# Patient Record
Sex: FEMALE | Race: WHITE | NOT HISPANIC OR LATINO | Employment: FULL TIME | URBAN - METROPOLITAN AREA
[De-identification: names, ages, dates, MRNs, and addresses within clinical notes are randomized per-mention and may not be internally consistent; named-entity substitution may affect disease eponyms.]

---

## 2017-09-19 ENCOUNTER — HOSPITAL ENCOUNTER (OUTPATIENT)
Dept: LAB | Facility: MEDICAL CENTER | Age: 37
End: 2017-09-19
Payer: COMMERCIAL

## 2017-09-19 LAB
BDY FAT % MEASURED: 36.2 %
BP DIAS: 81 MMHG
BP SYS: 130 MMHG
CHOLEST SERPL-MCNC: 134 MG/DL (ref 100–199)
DIABETES HTDIA: NO
EVENT NAME HTEVT: NORMAL
FASTING HTFAS: YES
GLUCOSE SERPL-MCNC: 96 MG/DL (ref 65–99)
HDLC SERPL-MCNC: 47 MG/DL
HYPERTENSION HTHYP: NO
LDLC SERPL CALC-MCNC: 71 MG/DL
SCREENING LOC CITY HTCIT: NORMAL
SCREENING LOC STATE HTSTA: NORMAL
SCREENING LOCATION HTLOC: NORMAL
SMOKING HTSMO: NO
SUBSCRIBER ID HTSID: NORMAL
TRIGL SERPL-MCNC: 78 MG/DL (ref 0–149)

## 2017-09-19 PROCEDURE — 80061 LIPID PANEL: CPT

## 2017-09-19 PROCEDURE — 36415 COLL VENOUS BLD VENIPUNCTURE: CPT

## 2017-09-19 PROCEDURE — 82947 ASSAY GLUCOSE BLOOD QUANT: CPT

## 2017-09-19 PROCEDURE — S5190 WELLNESS ASSESSMENT BY NONPH: HCPCS

## 2018-09-26 ENCOUNTER — HOSPITAL ENCOUNTER (OUTPATIENT)
Dept: LAB | Facility: MEDICAL CENTER | Age: 38
End: 2018-09-26
Payer: COMMERCIAL

## 2018-09-26 LAB
BDY FAT % MEASURED: 33.9 %
BP DIAS: 84 MMHG
BP SYS: 123 MMHG
CHOLEST SERPL-MCNC: 151 MG/DL (ref 100–199)
DIABETES HTDIA: NO
EVENT NAME HTEVT: NORMAL
FASTING HTFAS: YES
GLUCOSE SERPL-MCNC: 93 MG/DL (ref 65–99)
HDLC SERPL-MCNC: 42 MG/DL
HYPERTENSION HTHYP: NO
LDLC SERPL CALC-MCNC: 92 MG/DL
SCREENING LOC CITY HTCIT: NORMAL
SCREENING LOC STATE HTSTA: NORMAL
SCREENING LOCATION HTLOC: NORMAL
SMOKING HTSMO: NO
SUBSCRIBER ID HTSID: NORMAL
TRIGL SERPL-MCNC: 86 MG/DL (ref 0–149)

## 2018-09-26 PROCEDURE — 80061 LIPID PANEL: CPT

## 2018-09-26 PROCEDURE — 82947 ASSAY GLUCOSE BLOOD QUANT: CPT

## 2018-09-26 PROCEDURE — S5190 WELLNESS ASSESSMENT BY NONPH: HCPCS

## 2018-09-26 PROCEDURE — 36415 COLL VENOUS BLD VENIPUNCTURE: CPT

## 2018-10-03 ENCOUNTER — IMMUNIZATION (OUTPATIENT)
Dept: OCCUPATIONAL MEDICINE | Facility: CLINIC | Age: 38
End: 2018-10-03

## 2018-10-03 DIAGNOSIS — Z23 NEED FOR VACCINATION: ICD-10-CM

## 2018-10-10 PROCEDURE — 90686 IIV4 VACC NO PRSV 0.5 ML IM: CPT | Performed by: PREVENTIVE MEDICINE

## 2019-09-27 ENCOUNTER — HOSPITAL ENCOUNTER (OUTPATIENT)
Dept: LAB | Facility: MEDICAL CENTER | Age: 39
End: 2019-09-27
Payer: COMMERCIAL

## 2019-09-27 LAB
BDY FAT % MEASURED: 41.8 %
BP DIAS: 87 MMHG
BP SYS: 129 MMHG
CHOLEST SERPL-MCNC: 155 MG/DL (ref 100–199)
DIABETES HTDIA: NO
EVENT NAME HTEVT: NORMAL
FASTING HTFAS: YES
GLUCOSE SERPL-MCNC: 98 MG/DL (ref 65–99)
HDLC SERPL-MCNC: 43 MG/DL
HYPERTENSION HTHYP: NO
LDLC SERPL CALC-MCNC: 98 MG/DL
SCREENING LOC CITY HTCIT: NORMAL
SCREENING LOC STATE HTSTA: NORMAL
SCREENING LOCATION HTLOC: NORMAL
SMOKING HTSMO: NO
SUBSCRIBER ID HTSID: NORMAL
TRIGL SERPL-MCNC: 71 MG/DL (ref 0–149)

## 2019-09-27 PROCEDURE — 36415 COLL VENOUS BLD VENIPUNCTURE: CPT

## 2019-09-27 PROCEDURE — S5190 WELLNESS ASSESSMENT BY NONPH: HCPCS

## 2019-09-27 PROCEDURE — 82947 ASSAY GLUCOSE BLOOD QUANT: CPT

## 2019-09-27 PROCEDURE — 80061 LIPID PANEL: CPT

## 2020-05-07 ENCOUNTER — PHYSICAL THERAPY (OUTPATIENT)
Dept: PHYSICAL THERAPY | Facility: REHABILITATION | Age: 40
End: 2020-05-07
Attending: FAMILY MEDICINE
Payer: COMMERCIAL

## 2020-05-07 DIAGNOSIS — M25.539 PAIN IN WRIST, UNSPECIFIED LATERALITY: ICD-10-CM

## 2020-05-07 DIAGNOSIS — M25.519 SHOULDER PAIN, UNSPECIFIED CHRONICITY, UNSPECIFIED LATERALITY: ICD-10-CM

## 2020-05-07 PROCEDURE — 97535 SELF CARE MNGMENT TRAINING: CPT

## 2020-05-07 PROCEDURE — 97162 PT EVAL MOD COMPLEX 30 MIN: CPT

## 2020-05-07 ASSESSMENT — ENCOUNTER SYMPTOMS
PAIN SCALE AT HIGHEST: 5
PAIN SCALE: 0
PAIN TIMING: WHEN ACTIVE
PAIN SCALE AT LOWEST: 0

## 2020-05-07 NOTE — OP THERAPY EVALUATION
Outpatient Physical Therapy  INITIAL EVALUATION    [facility] Physical Therapy [address]  [address]  [place] [address]  Phone:  [phone]  Fax:  [phone]    Date of Evaluation: 05/07/2020    Patient: Adele Najera  YOB: 1980  MRN: [id number]     Referring Provider: FARIHA Lee  [address]  [place] [address]   Referring Diagnosis Pain in unspecified shoulder [M25.519];Pain in unspecified wrist [M25.539]     Time Calculation  Start time: 0800  Stop time: 0900 Time Calculation (min): 60 minutes       Physical Therapy Occurrence Codes    Date of onset of impairment:  8/7/17   Date physical therapy care plan established or reviewed:  5/7/20   Date physical therapy treatment started:  5/7/20          Chief Complaint: No chief complaint on file.    Visit Diagnoses     ICD-10-CM   1. Shoulder pain, unspecified chronicity, unspecified laterality M25.519   2. Pain in wrist, unspecified laterality M25.539         Subjective:   History of Present Illness:     Mechanism of injury:  R. Wrist pain began January 2020. When participating in martial arts wrist was contorted when practicing removing knife from hand. Went to [facility], xray, no fracture, recommended PT but did not attend. Now also experiencing L. Wrist pain of insidious onset for past few weeks, suspecting repetitive motions related to martial arts is related. Presently, I am also having pain in wrist with opening doors, holding pads with martial arts, and WB'ing through arms.     Intermittent R. Shoulder pain since 2017. Initially was doing sit-ups with 45# plate and I was performing felt immediate strain in R. Shoulder. Re-injured shoulder in martial arts practicing being thrown to ground and landed on R. Shoulder, occurred 2 years ago. Presently avoiding weight bearing through UE due to shoulder pain, holding pads, and also throwing punches. I feel a strain, in posterior shoulder.      Occupation: work in  lactation. Having pain carrying light weight in arms for extended time.     Other Msk: reporting pain in posterior R. Leg. First noticed when high kicking 3 weeks ago. No pain at rest.   Pain:     Current pain ratin    At best pain ratin    At worst pain ratin    Location:  R wrist, ulnar planter wrist; R. posterior shoulder worse (3/10)     Pain timing:  When active  Patient Goals:     Other patient goals:  End of Aug. physical testing for martial arts: 50 push ups, 50 sit ups, pull-ups, running       Past Medical History:   Diagnosis Date   • Asthma     exercise induced     No past surgical history on file.  Social History     Tobacco Use   • Smoking status: Never Smoker   Substance Use Topics   • Alcohol use: No     Family and Occupational History     Socioeconomic History   • Marital status:      Spouse name: Not on file   • Number of children: Not on file   • Years of education: Not on file   • Highest education level: Not on file   Occupational History   • Not on file       Objective     Cervical Screen    Cervical range of motion within normal limits with the following exceptions: Anterior L. Quadrant reproduces posterior R. Shoulder pain    Tenderness     Additional Tenderness Details  TTP pisiform on R.   Not TTP medial epicondyl or flexor carpi radialis, flex. Digitorum prof and superficialis   TTP at insertion of flexor carpi ulnaris     Active Range of Motion     Right Wrist   Wrist extension: with pain  Wrist pronation: with pain  Ulnar deviation: with pain    Additional Active Range of Motion Details  5 deg. Lack of wrist extension with exteded elbow on R. 45 vs 50 deg.     Pain at cannon wrist with extension    Open hand: pain with ulnar deviation, no pain closed hand    Passive Range of Motion     Right Wrist   Wrist extension: with pain  Ulnar deviation: with pain     Strength:      Additional Strength Details  Pain resisted wrist flexion no pain with MMT flexor digitorum  profundus and superficialis   Pain resisted ulnar deviation           Therapeutic Treatments and Modalities:     1. Functional Training, Self Care (CPT 65176), hand  towel roll when WB'ing through arms     2. Manual Therapy (CPT 49551), Ktape, neutral wrist, no stretch, for tissue protection cannon ulnar wrist     Time-based treatments/modalities:  Manual therapy minutes (CPT 89042): 5 minutes  Functional training, self care minutes (CPT 12607): 10 minutes       Assessment, Response and Plan:   Impairments: impaired physical strength, lacks appropriate home exercise program and pain with function    Assessment details:  Adele Najera is attending PT with chief complaints of R. Wrist pain of 3 week duration and chronic R. Shoulder pain of 3 year duration. Mrs. Najera expressed her wrist pain was limiting her the most presently so the the eval focussed on this body part. Eval finding support a ulnar wrist sprain in the sub-acute stage of tissue repair. Future visits will spend time evaluating patients shoulder and the plan of care will adjust accordingly to address relevant impairments identified.   Prognosis: good    Goals:   Short Term Goals:   No pain with active and passive wrist extension and radial deviation   No pain and 5/5 strength wrist flexion and ulnar deviation   Patient will be independent in home exercise program and self care strategies   No tenderness with palpation of pisiform and flexor carpi ulnaris tendon   Short term goal time span:  2-4 weeks      Long Term Goals:    Able to weight bear through arms without wrist or shoulder pain for 1 min duration   Patient will have a QDASH score indicating a significant perceived functional improvement from time of evaluation, and less than 0% perceived disability   Able to participate fully in martial arts without compensation or pain   Long term goal time span:  4-6 weeks    Plan:   Planned therapy interventions:  E Stim Unattended (CPT 18617),  Functional Training, Self Care (CPT 83789), Hot or Cold Pack Therapy (CPT 89242), Manual Therapy (CPT 24937), Neuromuscular Re-education (CPT 66761), Self Care ADL Training (CPT 63308), Therapeutic Activities (CPT 70890) and Therapeutic Exercise (CPT 70575)  Frequency:  2x week  Duration in weeks:  6  Discussed with:  Patient      Functional Assessment Used  Quickdash General Total Score: 13.64     Referring provider co-signature:  I have reviewed this plan of care and my co-signature certifies the need for services.  Certification Dates:   From 05/07/20   To 06/18/20    Physician Signature: ________________________________ Date: ______________

## 2020-05-12 ENCOUNTER — APPOINTMENT (OUTPATIENT)
Dept: PHYSICAL THERAPY | Facility: REHABILITATION | Age: 40
End: 2020-05-12
Attending: FAMILY MEDICINE
Payer: COMMERCIAL

## 2020-05-14 ENCOUNTER — PHYSICAL THERAPY (OUTPATIENT)
Dept: PHYSICAL THERAPY | Facility: REHABILITATION | Age: 40
End: 2020-05-14
Attending: FAMILY MEDICINE
Payer: COMMERCIAL

## 2020-05-14 DIAGNOSIS — M25.539 PAIN IN WRIST, UNSPECIFIED LATERALITY: ICD-10-CM

## 2020-05-14 DIAGNOSIS — M25.519 SHOULDER PAIN, UNSPECIFIED CHRONICITY, UNSPECIFIED LATERALITY: ICD-10-CM

## 2020-05-14 PROCEDURE — 97140 MANUAL THERAPY 1/> REGIONS: CPT

## 2020-05-14 PROCEDURE — 97014 ELECTRIC STIMULATION THERAPY: CPT

## 2020-05-14 NOTE — OP THERAPY DAILY TREATMENT
Outpatient Physical Therapy  DAILY TREATMENT     [facility] Physical [address].  [address]  [place] [address]  Phone:  [phone]  Fax:  [phone]    Date: 05/14/2020    Patient: Adele Najera  YOB: 1980  MRN: [id number]     Time Calculation    Start time: 1000  Stop time: 1115 Time Calculation (min): 75 minutes         Chief Complaint: Shoulder Problem and Wrist Problem    Visit #: Visit count could not be calculated. Make sure you are using a visit which is associated with an episode.    Subjective: Awoke with left shoulder pain going into neck. Wrist pain is reduced. I did do dead lifts yesterday.     Usually it's my R. Shoulder that hurts. I sometimes feel like it's coming from my neck.     OBJECTIVE:  Current objective measures:     Pain R cervical rotation into L. Shoulder  Pain even more so AROM anterior R. Quadrant    Post manual patient has full cervical rotation and reporting significant reduction in neck and shoulder pain.       Therapeutic Treatments and Modalities:     1. Manual Therapy (CPT 83013), shoulder , SCS TP at superior medial angle    2. Manual Therapy (CPT 80004), shoulder , SCS TP at L. mid cervical facet    3. E Stim Unattended (CPT 54549), shoulder, MHP and IFC 15 min duration     4. Manual Therapy (CPT 32249), taping cannon and ulnar wrist to support flexors     Time-based treatments/modalities:    Physical Therapy Timed Treatment Charges  Manual therapy minutes (CPT 62426): 45 minutes      ASSESSMENT:     Assessment/Response/Plan    Response to treatment: Pt presented with signs of myofascial strain to L. Upper trap, L. Levator scapula. Patient reported at end of session that this pain was reduced greatly and she demo'd improved cervical mobility without pain.     PLAN/RECOMMENDATIONS:   Plan for treatment: therapy treatment to continue next visit.  Planned interventions for next visit: continue with current treatment.

## 2020-05-19 ENCOUNTER — PHYSICAL THERAPY (OUTPATIENT)
Dept: PHYSICAL THERAPY | Facility: REHABILITATION | Age: 40
End: 2020-05-19
Attending: FAMILY MEDICINE
Payer: COMMERCIAL

## 2020-05-19 DIAGNOSIS — M25.539 PAIN IN WRIST, UNSPECIFIED LATERALITY: ICD-10-CM

## 2020-05-19 DIAGNOSIS — M25.519 SHOULDER PAIN, UNSPECIFIED CHRONICITY, UNSPECIFIED LATERALITY: ICD-10-CM

## 2020-05-19 PROCEDURE — 97012 MECHANICAL TRACTION THERAPY: CPT

## 2020-05-19 PROCEDURE — 97110 THERAPEUTIC EXERCISES: CPT

## 2020-05-19 PROCEDURE — 97140 MANUAL THERAPY 1/> REGIONS: CPT

## 2020-05-19 NOTE — OP THERAPY DAILY TREATMENT
Outpatient Physical Therapy  DAILY TREATMENT     [facility] Outpatient Physical Therapy [place] [address] [facility], [address]  [place] [address]  Phone:  [phone]    Date: 05/19/2020    Patient: Adele Najera  YOB: 1980  MRN: [id number]     Time Calculation    Start time: 1135  Stop time: 1220 Time Calculation (min): 45 minutes         Chief Complaint: No chief complaint on file.    Visit #: 3      SUBJECTIVE: woke up yesterday right sided cervical pain  , causing headaches cervical origing 4/10 vas but was a 8/10 vas yesterday      OBJECTIVE:  Current objective measures: pinch pain with right rotation, mod loss of lower cervical extension, repeated retraction          Therapeutic Exercises (CPT 94981):     1. Seated cervical retraction, 2 x 5    2. Seated cervical retraction/extension with rotation OP, 2 x 5    3. Posture re ed with cervical roll for sleeping/maintaining neutral spine    Therapeutic Treatments and Modalities:     1. Manual Therapy (CPT 78790), cervical manual traction 3 x 2 min, cervical retraction/extension with rotation overpressure, decrease//better cervical roation right and pain to 1/10 VAS    2. Mechanical Traction (CPT 88701), cervical 18/7lbs 45 sec on/15 sec off with MHP     Time-based treatments/modalities:    Physical Therapy Timed Treatment Charges  Manual therapy minutes (CPT 64778): 12 minutes  Therapeutic exercise minutes (CPT 90896): 14 minutes    ASSESSMENT:   Response to treatment: patient responding well to traction and  repeated retracted with op with significantly improved right  cervical rotation post treatment.   Patient states that she falls asleep propped up on pillows which may be contributing to symptoms.  Emphasized importance of cervical support for sleeping using towel roll. Progress cervical/scapular stability next session.    PLAN/RECOMMENDATIONS:   Plan for treatment: therapy treatment to continue next visit.  Planned interventions for next visit: continue  with current treatment.

## 2020-05-19 NOTE — OP THERAPY DAILY TREATMENT
"  Outpatient Physical Therapy  DAILY TREATMENT     [facility] Outpatient Physical Therapy [address], [address]  [place] [address]  Phone:  [phone]    Date: 05/19/2020    Patient: Adele Najera  YOB: 1980  MRN: [id number]     Time Calculation                   Chief Complaint: No chief complaint on file.    Visit #: Visit count could not be calculated. Make sure you are using a visit which is associated with an episode.    SUBJECTIVE:  ***    OBJECTIVE:  Current objective measures: ***          Therapeutic Treatments and Modalities:     1. Manual Therapy (CPT 39345), shoulder , SCS TP at superior medial angle    2. Manual Therapy (CPT 61649), shoulder , SCS TP at L. mid cervical facet    3. E Stim Unattended (CPT 45752), shoulder, MHP and IFC 15 min duration     4. Manual Therapy (CPT 79878), taping cannon and ulnar wrist to support flexors     Time-based treatments/modalities:           Pain rating (1-10) before treatment:  {PAIN NUMBERS_1-10:40982}  Pain rating (1-10) after treatment:  {PAIN NUMBERS_1-10:63590}    ASSESSMENT:   Response to treatment: ***    PLAN/RECOMMENDATIONS:   Plan for treatment: {AMB OP THERAPY - THERAPY PLAN:563526802::\"therapy treatment to continue next visit\"}.  Planned interventions for next visit: {PT PLANNED THERAPY INTERVENTIONS:405055762}.       "

## 2020-05-26 ENCOUNTER — PHYSICAL THERAPY (OUTPATIENT)
Dept: PHYSICAL THERAPY | Facility: REHABILITATION | Age: 40
End: 2020-05-26
Attending: FAMILY MEDICINE
Payer: COMMERCIAL

## 2020-05-26 DIAGNOSIS — M25.539 PAIN IN WRIST, UNSPECIFIED LATERALITY: ICD-10-CM

## 2020-05-26 DIAGNOSIS — M25.519 SHOULDER PAIN, UNSPECIFIED CHRONICITY, UNSPECIFIED LATERALITY: ICD-10-CM

## 2020-05-26 PROCEDURE — 97110 THERAPEUTIC EXERCISES: CPT

## 2020-05-26 NOTE — OP THERAPY DAILY TREATMENT
Outpatient Physical Therapy  DAILY TREATMENT     [facility] Outpatient Physical Therapy [address] [facility] [place] [address]  [place] [address]  Phone:  [phone]    Date: 05/26/2020    Patient: Adele Najera  YOB: 1980  MRN: [id number]     Time Calculation    Start time: 0800  Stop time: 0830 Time Calculation (min): 30 minutes         Chief Complaint: Shoulder Problem and Wrist Problem    Visit #: 4    SUBJECTIVE:  Neck pain relieved from previous PT but have had repeat episode this time into L. Shoulder and neck. Interfering with jiu jitsu.     R. Shoulder has pain when I exercise. Chronic feeling of tightness and restricted. Push ups bothersome in shoulder.     OBJECTIVE:  Current objective measures:     MMT  Middle trap 3+/5  Lower trap 3-/5           Therapeutic Exercises (CPT 62309):     1. Qped horizontal abd ext rotated humerus, 2 x 10 B    2. Wall scap lift off flexed to 180, 2 x 10 B    3. Standing horizontal abduction, orange 2 x 10 x 3'' hold       Time-based treatments/modalities:    Physical Therapy Timed Treatment Charges  Therapeutic exercise minutes (CPT 46685): 30 minutes      ASSESSMENT:   Response to treatment: Pt had complaint of L. Posterior shoulder strain. She also tested as having strength deficit of periscapular muscles bilaterally. Focus of session is brianna-scapular strengthening. Tolerated tx well.     PLAN/RECOMMENDATIONS:   Plan for treatment: therapy treatment to continue next visit.  Planned interventions for next visit: continue with current treatment.

## 2020-05-28 ENCOUNTER — APPOINTMENT (OUTPATIENT)
Dept: PHYSICAL THERAPY | Facility: REHABILITATION | Age: 40
End: 2020-05-28
Attending: FAMILY MEDICINE
Payer: COMMERCIAL

## 2020-10-09 ENCOUNTER — IMMUNIZATION (OUTPATIENT)
Dept: OCCUPATIONAL MEDICINE | Facility: CLINIC | Age: 40
End: 2020-10-09

## 2020-10-09 DIAGNOSIS — Z23 NEED FOR VACCINATION: ICD-10-CM

## 2020-10-09 PROCEDURE — 90686 IIV4 VACC NO PRSV 0.5 ML IM: CPT | Performed by: NURSE PRACTITIONER

## 2020-11-25 ENCOUNTER — HOSPITAL ENCOUNTER (OUTPATIENT)
Dept: LAB | Facility: MEDICAL CENTER | Age: 40
End: 2020-11-25
Attending: FAMILY MEDICINE
Payer: COMMERCIAL

## 2020-11-25 PROCEDURE — C9803 HOPD COVID-19 SPEC COLLECT: HCPCS

## 2020-11-25 PROCEDURE — U0003 INFECTIOUS AGENT DETECTION BY NUCLEIC ACID (DNA OR RNA); SEVERE ACUTE RESPIRATORY SYNDROME CORONAVIRUS 2 (SARS-COV-2) (CORONAVIRUS DISEASE [COVID-19]), AMPLIFIED PROBE TECHNIQUE, MAKING USE OF HIGH THROUGHPUT TECHNOLOGIES AS DESCRIBED BY CMS-2020-01-R: HCPCS

## 2020-11-26 LAB — COVID ORDER STATUS COVID19: NORMAL

## 2020-11-27 LAB
SARS-COV-2 RNA RESP QL NAA+PROBE: NOTDETECTED
SPECIMEN SOURCE: NORMAL

## 2020-12-20 DIAGNOSIS — Z23 NEED FOR VACCINATION: ICD-10-CM

## 2021-03-10 PROCEDURE — RXMED WILLOW AMBULATORY MEDICATION CHARGE: Performed by: PHYSICIAN ASSISTANT

## 2021-03-11 ENCOUNTER — PHARMACY VISIT (OUTPATIENT)
Dept: PHARMACY | Facility: MEDICAL CENTER | Age: 41
End: 2021-03-11
Payer: COMMERCIAL

## 2021-04-02 PROCEDURE — RXMED WILLOW AMBULATORY MEDICATION CHARGE: Performed by: PHYSICIAN ASSISTANT

## 2021-04-05 ENCOUNTER — PHARMACY VISIT (OUTPATIENT)
Dept: PHARMACY | Facility: MEDICAL CENTER | Age: 41
End: 2021-04-05
Payer: COMMERCIAL

## 2021-04-19 ENCOUNTER — HOSPITAL ENCOUNTER (OUTPATIENT)
Dept: LAB | Facility: MEDICAL CENTER | Age: 41
End: 2021-04-19
Attending: EMERGENCY MEDICINE
Payer: COMMERCIAL

## 2021-04-19 LAB
COVID ORDER STATUS COVID19: NORMAL
SARS-COV-2 RNA RESP QL NAA+PROBE: NOTDETECTED
SPECIMEN SOURCE: NORMAL

## 2021-12-07 ENCOUNTER — GYNECOLOGY VISIT (OUTPATIENT)
Dept: OBGYN | Facility: CLINIC | Age: 41
End: 2021-12-07
Payer: COMMERCIAL

## 2021-12-07 DIAGNOSIS — Z63.0 MARITAL CONFLICT: ICD-10-CM

## 2021-12-07 DIAGNOSIS — F41.9 ANXIETY: Chronic | ICD-10-CM

## 2021-12-07 DIAGNOSIS — Z30.9 ENCOUNTER FOR CONTRACEPTIVE MANAGEMENT, UNSPECIFIED TYPE: ICD-10-CM

## 2021-12-07 LAB
INT CON NEG: NEGATIVE
INT CON POS: POSITIVE
POC URINE PREGNANCY TEST: NEGATIVE

## 2021-12-07 PROCEDURE — 81025 URINE PREGNANCY TEST: CPT | Performed by: NURSE PRACTITIONER

## 2021-12-07 PROCEDURE — 99203 OFFICE O/P NEW LOW 30 MIN: CPT | Performed by: NURSE PRACTITIONER

## 2021-12-07 RX ORDER — BUPROPION HYDROCHLORIDE 100 MG/1
100 TABLET ORAL 2 TIMES DAILY
COMMUNITY
End: 2023-03-13

## 2021-12-07 SDOH — SOCIAL STABILITY - SOCIAL INSECURITY: PROBLEMS IN RELATIONSHIP WITH SPOUSE OR PARTNER: Z63.0

## 2021-12-07 NOTE — PROGRESS NOTES
HPI Comments:  Adele Najera is a 41 y.o. y.o. female who presents for problem gyn visit: worried about unplanned pregnancy. Pt has complaints of breast tenderness and had IC three days ago with withdrawal only.  Currently in marital counseling with her , Flavio who has a hx of drug and alcohol abuse.  They have teenage children who are also at risk.  She does not desire pregnancy because of her age and she is now becoming a grandmother.  She is only using withdrawal at this time and wants to consider LARC or permanent sterilization. Patient reports that she took Plan B last night approx. 48 hrs following unprotected sex.  .  Review of Systems :.  Constitutional: denies fatigue and breast tenderness resolving  Cardio: no palpitations or  edema   Resp: denies sob or chest pain  GI: no constipation, diarrhea or change in bowels  : irregular midcycle bleeding postivie    Pertinent positives documented in HPI and all other systems reviewed & are negative    All PMH, PSH, allergies, social history and FH reviewed and updated today:  Past Medical History:   Diagnosis Date   • Asthma     exercise induced     Past Surgical History:   Procedure Laterality Date   • MAMMOPLASTY AUGMENTATION       Patient has no known allergies.    Family History   Problem Relation Age of Onset   • Psychiatric Illness Mother    • Cancer Father    • No Known Problems Sister    • Drug abuse Brother    • Alcohol abuse Maternal Grandmother    • Alcohol abuse Maternal Grandfather    • Diabetes Paternal Grandmother    • Heart Disease Paternal Grandfather    • Alcohol abuse Paternal Grandfather      Medications:   Current Outpatient Medications Ordered in Epic   Medication Sig Dispense Refill   • buPROPion (WELLBUTRIN) 100 MG Tab Take 100 mg by mouth 2 times a day.     • sertraline (ZOLOFT) 25 MG tablet Take 1 tablet by mouth once a day 90 tablet 0     No current Epic-ordered facility-administered medications on file.          Objective:    Vital measurements:  There were no vitals taken for this visit.Declined weight and BP or recorded  There is no height or weight on file to calculate BMI. (Goal BM I>18 <25)    Physical Exam   Nursing note and no vitals to be reviewed.  Constitutional: She is oriented to person, place, and time. She appears well-developed and well-nourished. No distress.   Pelvic exam deferred this visit.  Neurological: She is alert and oriented to person, place, and time. She exhibits normal muscle tone.     Skin: Skin is warm and dry. No rash noted. She is not diaphoretic. No erythema. No pallor.     Psychiatric: She has a normal mood and affect. Her behavior is normal. Judgment and thought content normal.        Assessment:     1. Encounter for contraceptive management, unspecified type  HCG QUAL SERUM    POCT Pregnancy   2. Anxiety     3. Marital conflict           Plan:   Gyn exam deferred at present  .  Counseling: contraceptive options and elects Mirena IUD  Advised no IC between now and then, will confirm no pregnancy with qual hCG one day prior to scheduled insertion  Encourage exercise and proper diet.  See medications and orders placed in encounter report.    Return in about 1 week (around 12/14/2021).IUD insertion

## 2021-12-13 ENCOUNTER — HOSPITAL ENCOUNTER (OUTPATIENT)
Dept: LAB | Facility: MEDICAL CENTER | Age: 41
End: 2021-12-13
Attending: NURSE PRACTITIONER
Payer: COMMERCIAL

## 2021-12-13 DIAGNOSIS — Z30.9 ENCOUNTER FOR CONTRACEPTIVE MANAGEMENT, UNSPECIFIED TYPE: ICD-10-CM

## 2021-12-13 LAB — HCG SERPL QL: NEGATIVE

## 2021-12-13 PROCEDURE — 84703 CHORIONIC GONADOTROPIN ASSAY: CPT

## 2021-12-13 PROCEDURE — 36415 COLL VENOUS BLD VENIPUNCTURE: CPT

## 2021-12-14 ENCOUNTER — GYNECOLOGY VISIT (OUTPATIENT)
Dept: OBGYN | Facility: CLINIC | Age: 41
End: 2021-12-14
Payer: COMMERCIAL

## 2021-12-14 ENCOUNTER — HOSPITAL ENCOUNTER (OUTPATIENT)
Facility: MEDICAL CENTER | Age: 41
End: 2021-12-14
Attending: NURSE PRACTITIONER
Payer: COMMERCIAL

## 2021-12-14 DIAGNOSIS — Z01.419 WELL WOMAN EXAM WITH ROUTINE GYNECOLOGICAL EXAM: ICD-10-CM

## 2021-12-14 DIAGNOSIS — Z30.430 ENCOUNTER FOR IUD INSERTION: ICD-10-CM

## 2021-12-14 DIAGNOSIS — R03.0 ELEVATED BLOOD PRESSURE READING: ICD-10-CM

## 2021-12-14 DIAGNOSIS — Z12.4 ENCOUNTER FOR PAPANICOLAOU SMEAR FOR CERVICAL CANCER SCREENING: ICD-10-CM

## 2021-12-14 DIAGNOSIS — Z11.3 ROUTINE SCREENING FOR STI (SEXUALLY TRANSMITTED INFECTION): ICD-10-CM

## 2021-12-14 PROCEDURE — 88175 CYTOPATH C/V AUTO FLUID REDO: CPT

## 2021-12-14 PROCEDURE — 87491 CHLMYD TRACH DNA AMP PROBE: CPT

## 2021-12-14 PROCEDURE — 58300 INSERT INTRAUTERINE DEVICE: CPT | Performed by: NURSE PRACTITIONER

## 2021-12-14 PROCEDURE — 87624 HPV HI-RISK TYP POOLED RSLT: CPT

## 2021-12-14 PROCEDURE — 99396 PREV VISIT EST AGE 40-64: CPT | Mod: 25 | Performed by: NURSE PRACTITIONER

## 2021-12-14 PROCEDURE — 87591 N.GONORRHOEAE DNA AMP PROB: CPT

## 2021-12-14 NOTE — PROGRESS NOTES
Adele Najera is a 41 y.o. y.o. female who presents for her Gynecologic Exam        HPI Comments: Pt presents for well woman exam. Pt has no complaints currently.  She was concerned about pregnancy but   No LMP recorded. Patient has had an injection.    Review of Systems   ROS: Patient is feeling well. No dyspnea or chest pain on exertion. No Abdominal pain, change in bowel habits, black or bloody stools. No urinary sx. GYN ROS:normal menses, no abnormal bleeding, pelvic pain or discharge, no breast pain or new or enlarging lumps on self exam. Denies breast tenderness, mass, discharge, changes in size or contour, or abnormal cyclic discomfort. No neurological complaints.    Menstrual History: menses regular every 28-30 days  Contraceptive Method:IUD planned today    All PMH, PSH, allergies, social history and FH reviewed and updated today:  Past Medical History:   Diagnosis Date   • Asthma     exercise induced     Past Surgical History:   Procedure Laterality Date   • MAMMOPLASTY AUGMENTATION       Patient has no known allergies.    Family History   Problem Relation Age of Onset   • Psychiatric Illness Mother    • Cancer Father    • No Known Problems Sister    • Drug abuse Brother    • Alcohol abuse Maternal Grandmother    • Alcohol abuse Maternal Grandfather    • Diabetes Paternal Grandmother    • Heart Disease Paternal Grandfather    • Alcohol abuse Paternal Grandfather      Medications:   Current Outpatient Medications Ordered in Epic   Medication Sig Dispense Refill   • buPROPion (WELLBUTRIN) 100 MG Tab Take 100 mg by mouth 2 times a day.     • sertraline (ZOLOFT) 25 MG tablet Take 1 tablet by mouth once a day 90 tablet 0     No current Epic-ordered facility-administered medications on file.          Objective:   Vital measurements:  There were no vitals taken for this visit.  There is no height or weight on file to calculate BMI. (Goal BM I>18 <25)    Physical Exam   Nursing note and vitals  reviewed.  Constitutional: She is oriented to person, place, and time. She appears well-developed and well-nourished. No distress.     HEENT:   Head: Normocephalic and atraumatic.   Right Ear: External ear normal.   Left Ear: External ear normal.   Nose: Nose normal.   Eyes: Conjunctivae and EOM are normal. Pupils are equal, round, and reactive to light. No scleral icterus.     Neck: Normal range of motion. Neck supple. No tracheal deviation present. No thyromegaly present.     Pulmonary/Chest: Effort normal and breath sounds normal. No respiratory distress. She has no wheezes. She has no rales. She exhibits no tenderness.     Cardiovascular: Regular, rate and rhythm. No JVD.    Abdominal: Soft. Bowel sounds are normal. She exhibits no distension and no mass. No tenderness. She has no rebound and no guarding.     Breast:  Symmetrical, normal consistency without masses. mammoplasty scarring    Genitourinary:  Pelvic exam was performed with patient supine.  External genitalia, urethral meatus, urethra, bladder and vagina normal. Anus and perineum normal. Bimanual without masses or tenderness.  Vagina is moist with no lesions, foul discharge, erythema, tenderness or bleeding. No foreign body around the vagina or signs of injury.   Cervix exhibits no motion tenderness, no discharge and no friability.   Uterus is not deviated, not enlarged, not fixed and not tender, mid.  Right adnexum displays no mass, no tenderness and no fullness. Left adnexum displays no mass, no tenderness and no fullness.     Musculoskeletal: Normal range of motion. She exhibits no edema and no tenderness.     Lymphadenopathy: She has no cervical adenopathy.     Neurological: She is alert and oriented to person, place, and time. She exhibits normal muscle tone.     Skin: Skin is warm and dry. No rash noted. She is not diaphoretic. No erythema. No pallor.     Psychiatric: She has a normal mood and affect. Her behavior is normal. Judgment and thought  content normal.               Assessment:     1. Encounter for IUD insertion  levonorgestrel (Mirena) 52 mg (20 mcg/24 hr) IUD 1 Each    Consent for all Surgical, Special Diagnostic or Therapeutic Procedures   2. Well woman exam with routine gynecological exam     3. Encounter for Papanicolaou smear for cervical cancer screening  THINPREP PAP W/HPV AND CTNG   4. Routine screening for STI (sexually transmitted infection)  THINPREP PAP W/HPV AND CTNG   5. Elevated blood pressure reading           Plan:   Pap and physical exam performed  Monthly SBE.  Counseling: breast self exam and mammography screening  Encourage exercise and proper diet.  Mammograms starting @ age 40 annually.  See medications and orders placed in encounter report.      Today the patient is counseled on the risks of IUD insertion. Specifically discussed were alternative forms of birth control. I also discussed with the patient the risk of infection on insertion, and had asked the patient to remain on pelvic rest for one week following the insertion. We also discussed the risk of IUD expulsion, the risk of uterine perforation and IUD migration. If the IUD does migrate the patient may require further testing and a separate procedure such as a laparoscopy to retrieve the migrated IUD. I also discussed the 1% risk of pregnancy with IUD use. I also discussed the side effects of possible amenorrhea or irregular bleeding with the Mirena IUD, or heavy, painful menses with the copper IUD.The patient was given the opportunity to ask questions regarding insertion, risks and benefits, all questions are answered in their entirety.  Informed consent is signed.    Procedure note  Urine pregnancy test is negative, informed consent was previously signed.  The bimanual exam is performed the uterus is noted to be 7 weeks in size and is mid position.  A speculum was inserted into the vagina, the cervix was sprayed with topical lidocaine and then cleansed with Betadine  swabs x3.  A tenaculum was placed on the anterior lip of the cervix  The uterus was sounded to 7 centimeters  The Mirena  IUD was placed under sterile conditions.  The strings were trimmed to approximately 3 cm.  The tenaculum was removed from the cervix and hemostasis was achieved with silver nitrate.  The patient tolerated the procedure well.    The patient is asked to followup in 2 weeks for IUD check. The patient is asked to remain on pelvic rest for one week. She is asked to return sooner than 2 weeks for heavy vaginal bleeding uncontrolled pain or fever.

## 2021-12-15 DIAGNOSIS — Z11.3 ROUTINE SCREENING FOR STI (SEXUALLY TRANSMITTED INFECTION): ICD-10-CM

## 2021-12-15 DIAGNOSIS — Z12.4 ENCOUNTER FOR PAPANICOLAOU SMEAR FOR CERVICAL CANCER SCREENING: ICD-10-CM

## 2021-12-15 LAB
C TRACH DNA GENITAL QL NAA+PROBE: NEGATIVE
CYTOLOGY REG CYTOL: NORMAL
HPV HR 12 DNA CVX QL NAA+PROBE: NEGATIVE
HPV16 DNA SPEC QL NAA+PROBE: NEGATIVE
HPV18 DNA SPEC QL NAA+PROBE: NEGATIVE
N GONORRHOEA DNA GENITAL QL NAA+PROBE: NEGATIVE
SPECIMEN SOURCE: NORMAL
SPECIMEN SOURCE: NORMAL

## 2021-12-27 ENCOUNTER — TELEPHONE (OUTPATIENT)
Dept: PHYSICAL THERAPY | Facility: REHABILITATION | Age: 41
End: 2021-12-27

## 2021-12-27 NOTE — PROGRESS NOTES
Chief Complaint   Patient presents with   • Follow-Up     IUD check       History of present illness: 41 y.o. presents with above chief complaint. Pt had Mirena IUD placed without any complications and presents for an IUD check up. She reports spotting bleeding, cramping pain, no discomfort with intercourse, spotting discharge only. Denies fever, chills, nausea, vomiting. Overall very happy with device.    Review of systems:  Pertinent positives documented in HPI and all other systems reviewed & are negative    Seeing PCP next week and will address BP    All PMH, PSH, allergies, social history and FH reviewed and updated today:  Past Medical History:   Diagnosis Date   • Asthma     exercise induced       Past Surgical History:   Procedure Laterality Date   • MAMMOPLASTY AUGMENTATION         Allergies: No Known Allergies        Family History   Problem Relation Age of Onset   • Psychiatric Illness Mother    • Cancer Father    • No Known Problems Sister    • Drug abuse Brother    • Alcohol abuse Maternal Grandmother    • Alcohol abuse Maternal Grandfather    • Diabetes Paternal Grandmother    • Heart Disease Paternal Grandfather    • Alcohol abuse Paternal Grandfather        Physical exam:  /93     GENERAL APPEARANCE: healthy, alert, no distress, cooperative, smiling  ABDOMEN Abdomen soft, non-tender. No masses,  No organomegaly  FEMALE GYN: normal female external genitalia without lesions, mucusy vaginal discharge noted, vulva pink without erythema or friability, urethra is normal without discharge or scarring, no bladder fullness or masses, normal vagina and normal vaginal tone, normal cervix, normal  uterus, size and consistency, IUD strings seen and palpated with normal length of strings, normal anus and perineum.  EXTREMITIES:negative clubbing, cyanosis, edema    NEURO Awake, alert and oriented x 3, Normal gait, no sensory deficits  SKIN No rashes, or ulcers or lesions seen  PSYCHIATRIC: Patient shows  appropriate affect, is alert and oriented x3, intact judgment and insight.      1. IUD check up       IUD in appropriate location    Plans to improve nutrition and continue exercise for weight loss and BP management.    Spent  30 minutes in face-to-face patient contact in which greater than 50% of that visit was spent in counseling/coordination of care of IUD and normal menstrual irregularity side effects. Reminded patient to check for strings monthly and to call the office if IUD has fallen out or any other problems should arise. Also reminded patient IUD expires in 5 years.  Follow up yearly for annual exam or sooner as needed.

## 2021-12-27 NOTE — OP THERAPY DISCHARGE SUMMARY
Outpatient Physical Therapy  DISCHARGE SUMMARY NOTE      [facility]  [address], [address]  [place] [address]  Phone:  [phone]  Fax:  [phone]    Date of Visit: 12/27/2021    Patient: Adele Najera  YOB: 1980  MRN: [id number]     Referring Provider: FARIHA Lee   Referring Diagnosis Pain in unspecified shoulder [M25.519];Pain in unspecified wrist [M25.539]       Your patient is being discharged from Physical Therapy with the following comments:   · Patient has failed to schedule or reschedule follow-up visits    Comments:  Adele Najera has been discharged due to a lapse in care greater than 30 days. Thank you for the opportunity to assist you and your patient.      Limitations Remaining:  unknown    Recommendations:  F/u with PCP as needed    Ziggy Elias, PT, DPT    Date: 12/27/2021

## 2021-12-28 ENCOUNTER — GYNECOLOGY VISIT (OUTPATIENT)
Dept: OBGYN | Facility: CLINIC | Age: 41
End: 2021-12-28
Payer: COMMERCIAL

## 2021-12-28 VITALS — SYSTOLIC BLOOD PRESSURE: 132 MMHG | DIASTOLIC BLOOD PRESSURE: 93 MMHG

## 2021-12-28 DIAGNOSIS — Z30.431 IUD CHECK UP: ICD-10-CM

## 2021-12-28 PROCEDURE — 99213 OFFICE O/P EST LOW 20 MIN: CPT | Performed by: NURSE PRACTITIONER

## 2023-03-12 SDOH — HEALTH STABILITY: PHYSICAL HEALTH: ON AVERAGE, HOW MANY MINUTES DO YOU ENGAGE IN EXERCISE AT THIS LEVEL?: 60 MIN

## 2023-03-12 SDOH — ECONOMIC STABILITY: INCOME INSECURITY: HOW HARD IS IT FOR YOU TO PAY FOR THE VERY BASICS LIKE FOOD, HOUSING, MEDICAL CARE, AND HEATING?: NOT HARD AT ALL

## 2023-03-12 SDOH — ECONOMIC STABILITY: HOUSING INSECURITY
IN THE LAST 12 MONTHS, WAS THERE A TIME WHEN YOU DID NOT HAVE A STEADY PLACE TO SLEEP OR SLEPT IN A SHELTER (INCLUDING NOW)?: NO

## 2023-03-12 SDOH — ECONOMIC STABILITY: TRANSPORTATION INSECURITY
IN THE PAST 12 MONTHS, HAS LACK OF RELIABLE TRANSPORTATION KEPT YOU FROM MEDICAL APPOINTMENTS, MEETINGS, WORK OR FROM GETTING THINGS NEEDED FOR DAILY LIVING?: NO

## 2023-03-12 SDOH — ECONOMIC STABILITY: FOOD INSECURITY: WITHIN THE PAST 12 MONTHS, YOU WORRIED THAT YOUR FOOD WOULD RUN OUT BEFORE YOU GOT MONEY TO BUY MORE.: NEVER TRUE

## 2023-03-12 SDOH — ECONOMIC STABILITY: INCOME INSECURITY: IN THE LAST 12 MONTHS, WAS THERE A TIME WHEN YOU WERE NOT ABLE TO PAY THE MORTGAGE OR RENT ON TIME?: NO

## 2023-03-12 SDOH — ECONOMIC STABILITY: TRANSPORTATION INSECURITY
IN THE PAST 12 MONTHS, HAS THE LACK OF TRANSPORTATION KEPT YOU FROM MEDICAL APPOINTMENTS OR FROM GETTING MEDICATIONS?: NO

## 2023-03-12 SDOH — ECONOMIC STABILITY: FOOD INSECURITY: WITHIN THE PAST 12 MONTHS, THE FOOD YOU BOUGHT JUST DIDN'T LAST AND YOU DIDN'T HAVE MONEY TO GET MORE.: NEVER TRUE

## 2023-03-12 SDOH — ECONOMIC STABILITY: HOUSING INSECURITY: IN THE LAST 12 MONTHS, HOW MANY PLACES HAVE YOU LIVED?: 1

## 2023-03-12 SDOH — HEALTH STABILITY: PHYSICAL HEALTH: ON AVERAGE, HOW MANY DAYS PER WEEK DO YOU ENGAGE IN MODERATE TO STRENUOUS EXERCISE (LIKE A BRISK WALK)?: 3 DAYS

## 2023-03-12 SDOH — ECONOMIC STABILITY: TRANSPORTATION INSECURITY
IN THE PAST 12 MONTHS, HAS LACK OF TRANSPORTATION KEPT YOU FROM MEETINGS, WORK, OR FROM GETTING THINGS NEEDED FOR DAILY LIVING?: NO

## 2023-03-12 SDOH — HEALTH STABILITY: MENTAL HEALTH
STRESS IS WHEN SOMEONE FEELS TENSE, NERVOUS, ANXIOUS, OR CAN'T SLEEP AT NIGHT BECAUSE THEIR MIND IS TROUBLED. HOW STRESSED ARE YOU?: NOT AT ALL

## 2023-03-12 ASSESSMENT — SOCIAL DETERMINANTS OF HEALTH (SDOH)
IN A TYPICAL WEEK, HOW MANY TIMES DO YOU TALK ON THE PHONE WITH FAMILY, FRIENDS, OR NEIGHBORS?: MORE THAN THREE TIMES A WEEK
HOW HARD IS IT FOR YOU TO PAY FOR THE VERY BASICS LIKE FOOD, HOUSING, MEDICAL CARE, AND HEATING?: NOT HARD AT ALL
DO YOU BELONG TO ANY CLUBS OR ORGANIZATIONS SUCH AS CHURCH GROUPS UNIONS, FRATERNAL OR ATHLETIC GROUPS, OR SCHOOL GROUPS?: YES
HOW OFTEN DO YOU HAVE A DRINK CONTAINING ALCOHOL: NEVER
WITHIN THE PAST 12 MONTHS, YOU WORRIED THAT YOUR FOOD WOULD RUN OUT BEFORE YOU GOT THE MONEY TO BUY MORE: NEVER TRUE
HOW OFTEN DO YOU ATTEND CHURCH OR RELIGIOUS SERVICES?: MORE THAN 4 TIMES PER YEAR
HOW MANY DRINKS CONTAINING ALCOHOL DO YOU HAVE ON A TYPICAL DAY WHEN YOU ARE DRINKING: PATIENT DOES NOT DRINK
HOW OFTEN DO YOU GET TOGETHER WITH FRIENDS OR RELATIVES?: MORE THAN THREE TIMES A WEEK
HOW OFTEN DO YOU ATTEND CHURCH OR RELIGIOUS SERVICES?: MORE THAN 4 TIMES PER YEAR
IN A TYPICAL WEEK, HOW MANY TIMES DO YOU TALK ON THE PHONE WITH FAMILY, FRIENDS, OR NEIGHBORS?: MORE THAN THREE TIMES A WEEK
HOW OFTEN DO YOU HAVE SIX OR MORE DRINKS ON ONE OCCASION: NEVER
DO YOU BELONG TO ANY CLUBS OR ORGANIZATIONS SUCH AS CHURCH GROUPS UNIONS, FRATERNAL OR ATHLETIC GROUPS, OR SCHOOL GROUPS?: YES
HOW OFTEN DO YOU GET TOGETHER WITH FRIENDS OR RELATIVES?: MORE THAN THREE TIMES A WEEK
HOW OFTEN DO YOU ATTENT MEETINGS OF THE CLUB OR ORGANIZATION YOU BELONG TO?: MORE THAN 4 TIMES PER YEAR
HOW OFTEN DO YOU ATTENT MEETINGS OF THE CLUB OR ORGANIZATION YOU BELONG TO?: MORE THAN 4 TIMES PER YEAR

## 2023-03-12 ASSESSMENT — LIFESTYLE VARIABLES
HOW OFTEN DO YOU HAVE SIX OR MORE DRINKS ON ONE OCCASION: NEVER
HOW MANY STANDARD DRINKS CONTAINING ALCOHOL DO YOU HAVE ON A TYPICAL DAY: PATIENT DOES NOT DRINK
SKIP TO QUESTIONS 9-10: 1
HOW OFTEN DO YOU HAVE A DRINK CONTAINING ALCOHOL: NEVER
AUDIT-C TOTAL SCORE: 0

## 2023-03-13 ENCOUNTER — OFFICE VISIT (OUTPATIENT)
Dept: MEDICAL GROUP | Facility: MEDICAL CENTER | Age: 43
End: 2023-03-13
Payer: COMMERCIAL

## 2023-03-13 ENCOUNTER — PATIENT MESSAGE (OUTPATIENT)
Dept: MEDICAL GROUP | Facility: MEDICAL CENTER | Age: 43
End: 2023-03-13

## 2023-03-13 VITALS
OXYGEN SATURATION: 98 % | HEART RATE: 91 BPM | TEMPERATURE: 97.1 F | SYSTOLIC BLOOD PRESSURE: 144 MMHG | RESPIRATION RATE: 16 BRPM | HEIGHT: 67 IN | DIASTOLIC BLOOD PRESSURE: 92 MMHG | BODY MASS INDEX: 43.79 KG/M2 | WEIGHT: 279 LBS

## 2023-03-13 DIAGNOSIS — J45.990 EXERCISE-INDUCED ASTHMA: ICD-10-CM

## 2023-03-13 DIAGNOSIS — Z13.1 SCREENING FOR DIABETES MELLITUS: ICD-10-CM

## 2023-03-13 DIAGNOSIS — F41.9 ANXIETY: Chronic | ICD-10-CM

## 2023-03-13 DIAGNOSIS — F33.42 RECURRENT MAJOR DEPRESSIVE DISORDER, IN FULL REMISSION (HCC): ICD-10-CM

## 2023-03-13 DIAGNOSIS — Z13.0 SCREENING FOR DEFICIENCY ANEMIA: ICD-10-CM

## 2023-03-13 DIAGNOSIS — Z13.220 ENCOUNTER FOR LIPID SCREENING FOR CARDIOVASCULAR DISEASE: ICD-10-CM

## 2023-03-13 DIAGNOSIS — Z13.6 ENCOUNTER FOR LIPID SCREENING FOR CARDIOVASCULAR DISEASE: ICD-10-CM

## 2023-03-13 DIAGNOSIS — E66.01 CLASS 3 SEVERE OBESITY DUE TO EXCESS CALORIES WITH SERIOUS COMORBIDITY AND BODY MASS INDEX (BMI) OF 40.0 TO 44.9 IN ADULT (HCC): ICD-10-CM

## 2023-03-13 PROBLEM — E66.09 OBESITY DUE TO EXCESS CALORIES WITH SERIOUS COMORBIDITY: Status: ACTIVE | Noted: 2023-03-13

## 2023-03-13 PROCEDURE — 99204 OFFICE O/P NEW MOD 45 MIN: CPT | Performed by: FAMILY MEDICINE

## 2023-03-13 RX ORDER — ALBUTEROL SULFATE 90 UG/1
2 AEROSOL, METERED RESPIRATORY (INHALATION) EVERY 4 HOURS PRN
Qty: 1 EACH | Refills: 5 | Status: SHIPPED | OUTPATIENT
Start: 2023-03-13

## 2023-03-13 ASSESSMENT — PATIENT HEALTH QUESTIONNAIRE - PHQ9
SUM OF ALL RESPONSES TO PHQ9 QUESTIONS 1 AND 2: 0
9. THOUGHTS THAT YOU WOULD BE BETTER OFF DEAD, OR OF HURTING YOURSELF: NOT AT ALL
4. FEELING TIRED OR HAVING LITTLE ENERGY: NOT AT ALL
1. LITTLE INTEREST OR PLEASURE IN DOING THINGS: NOT AT ALL
3. TROUBLE FALLING OR STAYING ASLEEP OR SLEEPING TOO MUCH: NOT AT ALL
2. FEELING DOWN, DEPRESSED, IRRITABLE, OR HOPELESS: NOT AT ALL
SUM OF ALL RESPONSES TO PHQ QUESTIONS 1-9: 0
7. TROUBLE CONCENTRATING ON THINGS, SUCH AS READING THE NEWSPAPER OR WATCHING TELEVISION: NOT AT ALL
8. MOVING OR SPEAKING SO SLOWLY THAT OTHER PEOPLE COULD HAVE NOTICED. OR THE OPPOSITE, BEING SO FIGETY OR RESTLESS THAT YOU HAVE BEEN MOVING AROUND A LOT MORE THAN USUAL: NOT AT ALL
5. POOR APPETITE OR OVEREATING: NOT AT ALL
6. FEELING BAD ABOUT YOURSELF - OR THAT YOU ARE A FAILURE OR HAVE LET YOURSELF OR YOUR FAMILY DOWN: NOT AL ALL

## 2023-03-13 ASSESSMENT — ENCOUNTER SYMPTOMS
SHORTNESS OF BREATH: 0
WEIGHT LOSS: 0
WEAKNESS: 0
MYALGIAS: 0
FEVER: 0
COUGH: 0
ABDOMINAL PAIN: 0
CHILLS: 0
DEPRESSION: 0
DIZZINESS: 0
PALPITATIONS: 0
NERVOUS/ANXIOUS: 0

## 2023-03-13 NOTE — ASSESSMENT & PLAN NOTE
Chronic, unstable.  Discussed making changes to your diet and increasing cardiovascular exercise.  Reviewed GLP-1 inhibitors, advised to reach out to her insurance to see which one they are willing to cover.  Also recommended that she follow-up with the [employer] website in order to get a coupon.  Patient is interested in form health, documentation signed by the patient, given permission to share her medical information with form health

## 2023-03-13 NOTE — PROGRESS NOTES
Adele Najera is a pleasant 42 y.o. female here to establish care.    HPI:   Problem   Exercise-Induced Asthma    History of exercise induced asthma.  Does better with a slow increase in cardio, plans on getting back into her cardiovascular exercise.  Does not have an inhaler.     Obesity Due to Excess Calories With Serious Comorbidity    Long history with difficulty with her weight.  She was able to get herself in relatively good shape, started to do martial arts.  Started to go through increase in stress and when she started to follow with a therapist.  She was then initiated on Wellbutrin and Zoloft to treat anxiety and depression.  During that time she did have an increase in weight.  Now that she feels that her anxiety and depression are both under good control, she would like to get back into increasing her physical exercise with weight reduction.  She is interested in trying medication to help with combining it with increasing cardio.       Recurrent Major Depressive Disorder, in Full Remission (Hcc)    History of anxiety and depression, was on Wellbutrin and Zoloft for some time.  She was able to come off of them, feels relatively stable.  Does continue to follow with therapy.     Anxiety    History of anxiety in which she was on Zoloft.   Currently follows with therapy and no longer on medication.  Feels that her anxiety is relatively stable.     Morbid Obesity With Bmi of 40.0-44.9, Adult (Hcc) (Resolved)          Current medicines (including changes today)  Current Outpatient Medications   Medication Sig Dispense Refill    albuterol 108 (90 Base) MCG/ACT Aero Soln inhalation aerosol Inhale 2 Puffs every four hours as needed for Shortness of Breath (use 15 mins prior to activity). 1 Each 5     No current facility-administered medications for this visit.       Past Medical/ Surgical History  She  has a past medical history of Asthma.  She  has a past surgical history that includes mammoplasty  "augmentation.    Social History  Social History     Tobacco Use    Smoking status: Never    Smokeless tobacco: Never   Vaping Use    Vaping Use: Never used   Substance Use Topics    Alcohol use: No    Drug use: No     Social History     Social History Narrative    Not on file        Family History  Family History   Problem Relation Age of Onset    Psychiatric Illness Mother         depression/ anxiety    Cancer Father         leukemia    No Known Problems Sister     Drug abuse Brother     Psychiatric Illness Brother     Alcohol abuse Brother     Alcohol abuse Maternal Grandmother     Alcohol abuse Maternal Grandfather     Diabetes Paternal Grandmother     Heart Disease Paternal Grandfather     Alcohol abuse Paternal Grandfather     Diabetes Son         1     Family Status   Relation Name Status    Mo  (Not Specified)    Boo Milian     Sis  Alive    Bro Michael Alive    MGMo      MGFa Frosty     PGMo Bry     PGFa Frosty     Son  (Not Specified)         Review of Systems   Constitutional:  Negative for chills, fever, malaise/fatigue and weight loss.   Respiratory:  Negative for cough and shortness of breath.    Cardiovascular:  Negative for chest pain and palpitations.   Gastrointestinal:  Negative for abdominal pain.   Genitourinary: Negative.    Musculoskeletal:  Negative for myalgias.   Skin:  Negative for rash.   Neurological:  Negative for dizziness and weakness.   Psychiatric/Behavioral:  Negative for depression (stable). The patient is not nervous/anxious (stable).        Objective:     BP (!) 144/92 (BP Location: Left arm, Patient Position: Sitting, BP Cuff Size: Adult)   Pulse 91   Temp 36.2 °C (97.1 °F) (Temporal)   Resp 16   Ht 1.702 m (5' 7\")   Wt (!) 127 kg (279 lb)   SpO2 98%  Body mass index is 43.7 kg/m².    Physical Exam  Constitutional:       General: She is not in acute distress.     Appearance: She is obese.   HENT:      Head: Normocephalic and atraumatic. "      Right Ear: Tympanic membrane and external ear normal.      Left Ear: Tympanic membrane and external ear normal.   Eyes:      General: Lids are normal.      Extraocular Movements: Extraocular movements intact.      Conjunctiva/sclera: Conjunctivae normal.      Pupils: Pupils are equal, round, and reactive to light.   Neck:      Trachea: Trachea normal.   Cardiovascular:      Rate and Rhythm: Normal rate and regular rhythm.      Heart sounds: Normal heart sounds. No murmur heard.    No friction rub. No gallop.   Pulmonary:      Effort: Pulmonary effort is normal. No accessory muscle usage.      Breath sounds: Normal breath sounds. No wheezing or rales.   Abdominal:      General: Bowel sounds are normal.      Palpations: Abdomen is soft.      Tenderness: There is no abdominal tenderness.   Musculoskeletal:      Cervical back: Normal range of motion and neck supple.      Right lower leg: No edema.      Left lower leg: No edema.   Lymphadenopathy:      Cervical: No cervical adenopathy.   Skin:     General: Skin is warm and dry.      Findings: No rash.   Neurological:      General: No focal deficit present.      Mental Status: She is alert and oriented to person, place, and time. Mental status is at baseline.      GCS: GCS eye subscore is 4. GCS verbal subscore is 5. GCS motor subscore is 6.      Motor: No weakness.      Gait: Gait is intact.   Psychiatric:         Attention and Perception: Attention normal.         Mood and Affect: Mood and affect normal.         Speech: Speech normal.        Imaging:  No imaging    Labs:  No labs  Assessment and Plan:   The following treatment plan was discussed     Problem List Items Addressed This Visit       Anxiety (Chronic)     Chronic, stable.  Continue to follow with therapy and the recommendations.         Exercise-induced asthma     Chronic, stable.  We will go ahead and prescribe her albuterol 180 mcg/ACT aerosol solution inhalation aerosol 2 puffs every 4 hours as needed  for shortness of breath.  Recommended to use this 10 to 15 minutes prior to physical exercise.         Relevant Medications    albuterol 108 (90 Base) MCG/ACT Aero Soln inhalation aerosol    Obesity due to excess calories with serious comorbidity     Chronic, unstable.  Discussed making changes to your diet and increasing cardiovascular exercise.  Reviewed GLP-1 inhibitors, advised to reach out to her insurance to see which one they are willing to cover.  Also recommended that she follow-up with the [employer] website in order to get a coupon.  Patient is interested in form health, documentation signed by the patient, given permission to share her medical information with form health           Relevant Orders    Patient identified as having weight management issue.  Appropriate orders and counseling given.    Recurrent major depressive disorder, in full remission (HCC)     Chronic, stable.  Continue to monitor symptoms.  Continue fiber therapy.          Other Visit Diagnoses       Screening for deficiency anemia        Relevant Orders    CBC WITH DIFFERENTIAL    Screening for diabetes mellitus        Relevant Orders    Comp Metabolic Panel    ESTIMATED GFR    Encounter for lipid screening for cardiovascular disease        Relevant Orders    Lipid Profile             Followup: Return in about 4 weeks (around 4/10/2023), or if symptoms worsen or fail to improve, for follow-up.      Please note that this dictation was created using voice recognition software. I have made every reasonable attempt to correct obvious errors, but I expect that there are errors of grammar and possibly content that I did not discover before finalizing the note.

## 2023-03-13 NOTE — ASSESSMENT & PLAN NOTE
Chronic, stable.  We will go ahead and prescribe her albuterol 180 mcg/ACT aerosol solution inhalation aerosol 2 puffs every 4 hours as needed for shortness of breath.  Recommended to use this 10 to 15 minutes prior to physical exercise.

## 2023-04-07 ENCOUNTER — HOSPITAL ENCOUNTER (OUTPATIENT)
Dept: LAB | Facility: MEDICAL CENTER | Age: 43
End: 2023-04-07
Attending: FAMILY MEDICINE
Payer: COMMERCIAL

## 2023-04-07 DIAGNOSIS — Z13.6 ENCOUNTER FOR LIPID SCREENING FOR CARDIOVASCULAR DISEASE: ICD-10-CM

## 2023-04-07 DIAGNOSIS — Z13.220 ENCOUNTER FOR LIPID SCREENING FOR CARDIOVASCULAR DISEASE: ICD-10-CM

## 2023-04-07 DIAGNOSIS — Z13.0 SCREENING FOR DEFICIENCY ANEMIA: ICD-10-CM

## 2023-04-07 DIAGNOSIS — Z13.1 SCREENING FOR DIABETES MELLITUS: ICD-10-CM

## 2023-04-07 LAB
BASOPHILS # BLD AUTO: 0.8 % (ref 0–1.8)
BASOPHILS # BLD: 0.08 K/UL (ref 0–0.12)
EOSINOPHIL # BLD AUTO: 0.1 K/UL (ref 0–0.51)
EOSINOPHIL NFR BLD: 1.1 % (ref 0–6.9)
ERYTHROCYTE [DISTWIDTH] IN BLOOD BY AUTOMATED COUNT: 38.7 FL (ref 35.9–50)
HCT VFR BLD AUTO: 41.8 % (ref 37–47)
HGB BLD-MCNC: 14 G/DL (ref 12–16)
IMM GRANULOCYTES # BLD AUTO: 0.03 K/UL (ref 0–0.11)
IMM GRANULOCYTES NFR BLD AUTO: 0.3 % (ref 0–0.9)
LYMPHOCYTES # BLD AUTO: 2.42 K/UL (ref 1–4.8)
LYMPHOCYTES NFR BLD: 25.6 % (ref 22–41)
MCH RBC QN AUTO: 28.9 PG (ref 27–33)
MCHC RBC AUTO-ENTMCNC: 33.5 G/DL (ref 33.6–35)
MCV RBC AUTO: 86.4 FL (ref 81.4–97.8)
MONOCYTES # BLD AUTO: 0.66 K/UL (ref 0–0.85)
MONOCYTES NFR BLD AUTO: 7 % (ref 0–13.4)
NEUTROPHILS # BLD AUTO: 6.17 K/UL (ref 2–7.15)
NEUTROPHILS NFR BLD: 65.2 % (ref 44–72)
NRBC # BLD AUTO: 0 K/UL
NRBC BLD-RTO: 0 /100 WBC
PLATELET # BLD AUTO: 341 K/UL (ref 164–446)
PMV BLD AUTO: 10.6 FL (ref 9–12.9)
RBC # BLD AUTO: 4.84 M/UL (ref 4.2–5.4)
WBC # BLD AUTO: 9.5 K/UL (ref 4.8–10.8)

## 2023-04-07 PROCEDURE — 36415 COLL VENOUS BLD VENIPUNCTURE: CPT

## 2023-04-07 PROCEDURE — 80061 LIPID PANEL: CPT

## 2023-04-07 PROCEDURE — 85025 COMPLETE CBC W/AUTO DIFF WBC: CPT

## 2023-04-07 PROCEDURE — 80053 COMPREHEN METABOLIC PANEL: CPT

## 2023-04-08 LAB
ALBUMIN SERPL BCP-MCNC: 4 G/DL (ref 3.2–4.9)
ALBUMIN/GLOB SERPL: 1.3 G/DL
ALP SERPL-CCNC: 60 U/L (ref 30–99)
ALT SERPL-CCNC: 14 U/L (ref 2–50)
ANION GAP SERPL CALC-SCNC: 14 MMOL/L (ref 7–16)
AST SERPL-CCNC: 14 U/L (ref 12–45)
BILIRUB SERPL-MCNC: 0.4 MG/DL (ref 0.1–1.5)
BUN SERPL-MCNC: 8 MG/DL (ref 8–22)
CALCIUM ALBUM COR SERPL-MCNC: 9.1 MG/DL (ref 8.5–10.5)
CALCIUM SERPL-MCNC: 9.1 MG/DL (ref 8.5–10.5)
CHLORIDE SERPL-SCNC: 107 MMOL/L (ref 96–112)
CHOLEST SERPL-MCNC: 165 MG/DL (ref 100–199)
CO2 SERPL-SCNC: 21 MMOL/L (ref 20–33)
CREAT SERPL-MCNC: 0.78 MG/DL (ref 0.5–1.4)
FASTING STATUS PATIENT QL REPORTED: NORMAL
GFR SERPLBLD CREATININE-BSD FMLA CKD-EPI: 95 ML/MIN/1.73 M 2
GLOBULIN SER CALC-MCNC: 3 G/DL (ref 1.9–3.5)
GLUCOSE SERPL-MCNC: 80 MG/DL (ref 65–99)
HDLC SERPL-MCNC: 39 MG/DL
LDLC SERPL CALC-MCNC: 106 MG/DL
POTASSIUM SERPL-SCNC: 4.6 MMOL/L (ref 3.6–5.5)
PROT SERPL-MCNC: 7 G/DL (ref 6–8.2)
SODIUM SERPL-SCNC: 142 MMOL/L (ref 135–145)
TRIGL SERPL-MCNC: 102 MG/DL (ref 0–149)

## 2023-04-10 ENCOUNTER — OFFICE VISIT (OUTPATIENT)
Dept: MEDICAL GROUP | Facility: MEDICAL CENTER | Age: 43
End: 2023-04-10
Payer: COMMERCIAL

## 2023-04-10 VITALS
OXYGEN SATURATION: 98 % | HEART RATE: 96 BPM | WEIGHT: 276 LBS | RESPIRATION RATE: 16 BRPM | TEMPERATURE: 97.6 F | SYSTOLIC BLOOD PRESSURE: 128 MMHG | HEIGHT: 67 IN | DIASTOLIC BLOOD PRESSURE: 88 MMHG | BODY MASS INDEX: 43.32 KG/M2

## 2023-04-10 DIAGNOSIS — Z13.0 SCREENING FOR DEFICIENCY ANEMIA: ICD-10-CM

## 2023-04-10 DIAGNOSIS — E66.01 CLASS 3 SEVERE OBESITY DUE TO EXCESS CALORIES WITH SERIOUS COMORBIDITY AND BODY MASS INDEX (BMI) OF 40.0 TO 44.9 IN ADULT (HCC): ICD-10-CM

## 2023-04-10 DIAGNOSIS — J30.2 SEASONAL ALLERGIES: ICD-10-CM

## 2023-04-10 DIAGNOSIS — Z13.21 ENCOUNTER FOR VITAMIN DEFICIENCY SCREENING: ICD-10-CM

## 2023-04-10 DIAGNOSIS — Z13.1 SCREENING FOR DIABETES MELLITUS: ICD-10-CM

## 2023-04-10 DIAGNOSIS — E78.00 PURE HYPERCHOLESTEROLEMIA: ICD-10-CM

## 2023-04-10 PROCEDURE — 99214 OFFICE O/P EST MOD 30 MIN: CPT | Performed by: FAMILY MEDICINE

## 2023-04-10 RX ORDER — AZELASTINE HYDROCHLORIDE 0.5 MG/ML
1 SOLUTION/ DROPS OPHTHALMIC 2 TIMES DAILY
Qty: 6 ML | Refills: 1 | Status: SHIPPED | OUTPATIENT
Start: 2023-04-10

## 2023-04-10 ASSESSMENT — FIBROSIS 4 INDEX: FIB4 SCORE: 0.48

## 2023-04-10 NOTE — PROGRESS NOTES
Adele Najera is a pleasant 42 y.o. female here for   Chief Complaint   Patient presents with    Follow-Up     Lab review      HPI:   Problem   Pure Hypercholesterolemia      Component      Latest Ref Rng 4/7/2023   Cholesterol,Tot      100 - 199 mg/dL 165    Triglycerides      0 - 149 mg/dL 102    HDL      >=40 mg/dL 39 !    LDL      <100 mg/dL 106 (H)       ! Abnormal  (H) High       Seasonal Allergies    History of seasonal allergies.  Was prescribed a steroid eyedrop in the past to help with symptoms.  States her previous provider thought it may be allergies and recommended Zyrtec daily.  States that her symptoms are hit or miss would not like to take Zyrtec on a daily basis.  Requesting eyedrops to help with symptoms.     Obesity Due to Excess Calories With Serious Comorbidity    Long history with difficulty with her weight.  She was able to get herself in relatively good shape, started to do martial arts.  Started to go through increase in stress and when she started to follow with a therapist.  She was then initiated on Wellbutrin and Zoloft to treat anxiety and depression.  During that time she did have an increase in weight.  Now that she feels that her anxiety and depression are both under good control, she would like to get back into increasing her physical exercise with weight reduction.  Has an appointment with a physician and a dietitian this week with [facility].  Has not been able to initiate her Wegovy at this time waiting for prior authorization from her insurance.          Current Medicines (including changes today)  Current Outpatient Medications   Medication Sig Dispense Refill    azelastine (OPTIVAR) 0.05 % ophthalmic solution Administer 1 Drop into both eyes 2 times a day. 6 mL 1    Semaglutide,0.25 or 0.5MG/DOS, 2 MG/1.5ML Solution Pen-injector Inject 0.25 mg under the skin every 7 days for 28 days, THEN 0.5 mg every 7 days for 28 days. 3 mL 0    albuterol 108 (90 Base) MCG/ACT Aero Soln  "inhalation aerosol Inhale 2 Puffs every four hours as needed for Shortness of Breath (use 15 mins prior to activity). 1 Each 5     No current facility-administered medications for this visit.     Past Medical/ Surgical History  She  has a past medical history of Asthma.  She  has a past surgical history that includes mammoplasty augmentation.     Objective:     /88 (BP Location: Left arm, Patient Position: Sitting, BP Cuff Size: Large adult)   Pulse 96   Temp 36.4 °C (97.6 °F) (Temporal)   Resp 16   Ht 1.702 m (5' 7\")   Wt (!) 125 kg (276 lb)   SpO2 98%  Body mass index is 43.23 kg/m².    Physical Exam  Constitutional:       General: She is not in acute distress.  HENT:      Head: Normocephalic and atraumatic.   Eyes:      Conjunctiva/sclera: Conjunctivae normal.      Pupils: Pupils are equal, round, and reactive to light.   Pulmonary:      Effort: Pulmonary effort is normal. No respiratory distress.   Abdominal:      General: There is no distension.   Musculoskeletal:      Cervical back: Normal range of motion and neck supple.   Skin:     General: Skin is warm and dry.      Findings: No rash.   Neurological:      Mental Status: She is alert and oriented to person, place, and time.      Gait: Gait is intact.   Psychiatric:         Mood and Affect: Affect normal.        Imaging:  No imaging    Labs  Recent labs from 04/07/2023 reviewed with the patient.    Assessment and Plan:   The following treatment plan was discussed     Problem List Items Addressed This Visit       Obesity due to excess calories with serious comorbidity     Chronic, Stable.  We will confirm prior authorization for patient to forward with Wegov[employer].  Educated on the importance of eating lots of fruits and vegetables.  Limits animal fats and proteins, specifically red meat such as beef and pork.  Increasing physical activity, 140 to 150 minutes of cardio a week with heart rate goal of 120 to 130 bpm sustained during the activity.         " Pure hypercholesterolemia     New diagnosis for the patient.  Educated on the importance of eating lots of fruits and vegetables.  Limits animal fats and proteins, specifically red meat such as beef and pork.  Increasing physical activity, 140 to 150 minutes of cardio a week with heart rate goal of 120 to 130 bpm sustained during the activity.         Relevant Orders    Lipid Profile    Seasonal allergies     Chronic, unstable.  Azelastine eyedrops 1 drop twice a day to help with symptoms.  Recommended for steroid eyedrops to follow-up with her ophthalmologist.         Relevant Medications    azelastine (OPTIVAR) 0.05 % ophthalmic solution     Other Visit Diagnoses       Screening for deficiency anemia        Relevant Orders    CBC WITH DIFFERENTIAL    Encounter for vitamin deficiency screening        Relevant Orders    VITAMIN D,25 HYDROXY (DEFICIENCY)    Screening for diabetes mellitus        Relevant Orders    Comp Metabolic Panel    ESTIMATED GFR             Followup: Return in about 1 year (around 4/10/2024), or if symptoms worsen or fail to improve, for Annual and labs.    Please note that this dictation was created using voice recognition software. I have made every reasonable attempt to correct obvious errors, but I expect that there are errors of grammar and possibly content that I did not discover before finalizing the note.

## 2023-04-10 NOTE — ASSESSMENT & PLAN NOTE
New diagnosis for the patient.  Educated on the importance of eating lots of fruits and vegetables.  Limits animal fats and proteins, specifically red meat such as beef and pork.  Increasing physical activity, 140 to 150 minutes of cardio a week with heart rate goal of 120 to 130 bpm sustained during the activity.

## 2023-04-10 NOTE — ASSESSMENT & PLAN NOTE
Chronic, unstable.  Azelastine eyedrops 1 drop twice a day to help with symptoms.  Recommended for steroid eyedrops to follow-up with her ophthalmologist.

## 2023-04-10 NOTE — ASSESSMENT & PLAN NOTE
Chronic, Stable.  We will confirm prior authorization for patient to forward with Wegovy.  Educated on the importance of eating lots of fruits and vegetables.  Limits animal fats and proteins, specifically red meat such as beef and pork.  Increasing physical activity, 140 to 150 minutes of cardio a week with heart rate goal of 120 to 130 bpm sustained during the activity.

## 2023-04-24 ENCOUNTER — TELEPHONE (OUTPATIENT)
Dept: MEDICAL GROUP | Facility: MEDICAL CENTER | Age: 43
End: 2023-04-24
Payer: COMMERCIAL

## 2023-04-25 ENCOUNTER — TELEPHONE (OUTPATIENT)
Dept: MEDICAL GROUP | Facility: MEDICAL CENTER | Age: 43
End: 2023-04-25

## 2023-04-25 NOTE — TELEPHONE ENCOUNTER
MEDICATION PRIOR AUTHORIZATION NEEDED:    1. Name of Medication: Ozempic    2. Requested By (Name of Pharmacy): [facility]      3. Is insurance on file current? YES    4. What is the name & phone number of the 3rd party payor? Jesus Alberto [phone]

## 2023-04-25 NOTE — TELEPHONE ENCOUNTER
1. Name: Adele Najera      Call Back Number: [phone] (home)         How would the patient prefer to be contacted with a response: Phone call OK to leave a detailed message    Lyly from [facility] called in [facility] saying she needed most recent Lab results faxed to [phone]. If theres any Questions please call her at [phone].    Thank you

## 2024-04-08 ENCOUNTER — APPOINTMENT (OUTPATIENT)
Dept: MEDICAL GROUP | Facility: MEDICAL CENTER | Age: 44
End: 2024-04-08
Payer: COMMERCIAL

## 2024-06-01 ENCOUNTER — OFFICE VISIT (OUTPATIENT)
Dept: URGENT CARE | Facility: CLINIC | Age: 44
End: 2024-06-01
Payer: COMMERCIAL

## 2024-06-01 VITALS
OXYGEN SATURATION: 98 % | DIASTOLIC BLOOD PRESSURE: 66 MMHG | SYSTOLIC BLOOD PRESSURE: 124 MMHG | WEIGHT: 218 LBS | BODY MASS INDEX: 34.21 KG/M2 | HEART RATE: 90 BPM | RESPIRATION RATE: 20 BRPM | HEIGHT: 67 IN | TEMPERATURE: 97.2 F

## 2024-06-01 DIAGNOSIS — H61.23 BILATERAL IMPACTED CERUMEN: ICD-10-CM

## 2024-06-01 PROCEDURE — 3074F SYST BP LT 130 MM HG: CPT | Performed by: NURSE PRACTITIONER

## 2024-06-01 PROCEDURE — 99213 OFFICE O/P EST LOW 20 MIN: CPT | Performed by: NURSE PRACTITIONER

## 2024-06-01 PROCEDURE — 3078F DIAST BP <80 MM HG: CPT | Performed by: NURSE PRACTITIONER

## 2024-06-01 ASSESSMENT — FIBROSIS 4 INDEX: FIB4 SCORE: 0.5

## 2024-06-02 NOTE — PROGRESS NOTES
"Subjective:   Adele Najera is a 44 y.o. female who presents for Otalgia (Ear is full and want to have recheck after going to primary )      Ear Fullness  This is a new problem. The current episode started 1 to 4 weeks ago. The problem occurs constantly. The problem has been unchanged. Associated symptoms comments: Ear fullness  . Treatments tried: ear lavage. The treatment provided no relief.       Review of Systems   HENT:  Negative for ear discharge and ear pain.         Ear fullness         Medications:    albuterol Aers  azelastine    Allergies: Patient has no known allergies.    Problem List: Adele Najera does not have any pertinent problems on file.    Surgical History:  Past Surgical History:   Procedure Laterality Date    MAMMOPLASTY AUGMENTATION         Past Social Hx: Adele Najera  reports that she has never smoked. She has never used smokeless tobacco. She reports that she does not drink alcohol and does not use drugs.     Past Family Hx:  Adele Najera family history includes Alcohol abuse in her brother, maternal grandfather, maternal grandmother, and paternal grandfather; Cancer in her father; Diabetes in her paternal grandmother and son; Drug abuse in her brother; Heart Disease in her paternal grandfather; No Known Problems in her sister; Psychiatric Illness in her brother and mother.     Problem list, medications, and allergies reviewed by myself today in Epic.     Objective:     /66 (BP Location: Left arm, Patient Position: Sitting)   Pulse 90   Temp 36.2 °C (97.2 °F) (Temporal)   Resp 20   Ht 1.702 m (5' 7\")   Wt 98.9 kg (218 lb)   SpO2 98%   BMI 34.14 kg/m²     Physical Exam  Constitutional:       Appearance: Normal appearance. She is not ill-appearing or toxic-appearing.   HENT:      Head: Normocephalic.      Right Ear: External ear normal. There is impacted cerumen.      Left Ear: External ear normal. There is impacted cerumen.      Nose: Nose normal.      Mouth/Throat:      " Lips: Pink.   Eyes:      General: Lids are normal.   Pulmonary:      Effort: Pulmonary effort is normal. No accessory muscle usage.   Musculoskeletal:      Cervical back: Full passive range of motion without pain.   Neurological:      Mental Status: She is alert and oriented to person, place, and time.   Psychiatric:         Mood and Affect: Mood normal.         Thought Content: Thought content normal.         Assessment/Plan:     Diagnosis and associated orders:     1. Bilateral impacted cerumen           Comments/MDM:     Procedure: Cerumen Removal  Risks and benefits of procedure discussed  Cerumen removed with curette and lavage after softening agent instilled  Patient tolerated well  Post procedure exam with clear canal and normal TM  Differential diagnosis, natural history, supportive care, and indications for immediate follow-up discussed.            Please note that this dictation was created using voice recognition software. I have made a reasonable attempt to correct obvious errors, but I expect that there are errors of grammar and possibly content that I did not discover before finalizing the note.    This note was electronically signed by Troy CUMMINS.

## 2024-12-12 NOTE — ASSESSMENT & PLAN NOTE
Caller: AMBROSIO BROUSSARD    Relationship: Mother    Best call back number: [phone]     Requested Prescriptions:   Requested Prescriptions     Pending Prescriptions Disp Refills    lamoTRIgine (LaMICtal) 150 MG tablet 30 tablet 2     Sig: Take 0.5 tablets by mouth 2 (Two) Times a Day.    guanFACINE (TENEX) 1 MG tablet 60 tablet 2     Sig: Take 1 tablet by mouth 2 (Two) Times a Day.    lisdexamfetamine (Vyvanse) 40 MG capsule 30 capsule 0        Pharmacy where request should be sent: [facility] [phone] [facility] [phone]      Last office visit with prescribing clinician: 2/6/2024   Last telemedicine visit with prescribing clinician: Visit date not found   Next office visit with prescribing clinician: Visit date not found     Additional details provided by patient: OUT OF MEDS AS OF TODAY    Does the patient have less than a 3 day supply:  [x] Yes  [] No    Would you like a call back once the refill request has been completed: [] Yes [x] No    If the office needs to give you a call back, can they leave a voicemail: [] Yes [x] No    [employer]t Woo Rep   12/12/24 13:55 CST          Chronic, unstable.  Discussed making changes to your diet and increasing cardiovascular exercise.  Reviewed GLP-1 inhibitors, advised to reach out to her insurance to see which one they are willing to cover.  Also recommended that she follow-up with the [employer] website in order to get a coupon.  Patient is interested in form health, documentation signed by the patient, given permission to share her medical information with [employer]

## 2025-06-20 ENCOUNTER — HOSPITAL ENCOUNTER (OUTPATIENT)
Dept: RADIOLOGY | Facility: MEDICAL CENTER | Age: 45
End: 2025-06-20
Attending: OBSTETRICS & GYNECOLOGY
Payer: COMMERCIAL

## 2025-06-20 DIAGNOSIS — Z12.31 VISIT FOR SCREENING MAMMOGRAM: ICD-10-CM

## 2025-06-20 PROCEDURE — 77063 BREAST TOMOSYNTHESIS BI: CPT

## 2025-06-30 ENCOUNTER — HOSPITAL ENCOUNTER (OUTPATIENT)
Dept: LAB | Facility: MEDICAL CENTER | Age: 45
End: 2025-06-30
Attending: SURGERY
Payer: COMMERCIAL

## 2025-06-30 LAB
ANION GAP SERPL CALC-SCNC: 10 MMOL/L (ref 7–16)
BASOPHILS # BLD AUTO: 0.8 % (ref 0–1.8)
BASOPHILS # BLD: 0.06 K/UL (ref 0–0.12)
BUN SERPL-MCNC: 7 MG/DL (ref 8–22)
CALCIUM SERPL-MCNC: 9.6 MG/DL (ref 8.5–10.5)
CHLORIDE SERPL-SCNC: 105 MMOL/L (ref 96–112)
CO2 SERPL-SCNC: 25 MMOL/L (ref 20–33)
CREAT SERPL-MCNC: 0.92 MG/DL (ref 0.5–1.4)
EOSINOPHIL # BLD AUTO: 0.07 K/UL (ref 0–0.51)
EOSINOPHIL NFR BLD: 0.9 % (ref 0–6.9)
ERYTHROCYTE [DISTWIDTH] IN BLOOD BY AUTOMATED COUNT: 42.5 FL (ref 35.9–50)
GFR SERPLBLD CREATININE-BSD FMLA CKD-EPI: 77 ML/MIN/1.73 M 2
GLUCOSE SERPL-MCNC: 98 MG/DL (ref 65–99)
HCT VFR BLD AUTO: 47.9 % (ref 37–47)
HGB BLD-MCNC: 15.5 G/DL (ref 12–16)
IMM GRANULOCYTES # BLD AUTO: 0.02 K/UL (ref 0–0.11)
IMM GRANULOCYTES NFR BLD AUTO: 0.3 % (ref 0–0.9)
LYMPHOCYTES # BLD AUTO: 1.94 K/UL (ref 1–4.8)
LYMPHOCYTES NFR BLD: 24.7 % (ref 22–41)
MCH RBC QN AUTO: 30.7 PG (ref 27–33)
MCHC RBC AUTO-ENTMCNC: 32.4 G/DL (ref 32.2–35.5)
MCV RBC AUTO: 94.9 FL (ref 81.4–97.8)
MONOCYTES # BLD AUTO: 0.56 K/UL (ref 0–0.85)
MONOCYTES NFR BLD AUTO: 7.1 % (ref 0–13.4)
NEUTROPHILS # BLD AUTO: 5.21 K/UL (ref 1.82–7.42)
NEUTROPHILS NFR BLD: 66.2 % (ref 44–72)
NRBC # BLD AUTO: 0 K/UL
NRBC BLD-RTO: 0 /100 WBC (ref 0–0.2)
PLATELET # BLD AUTO: 355 K/UL (ref 164–446)
PMV BLD AUTO: 10.8 FL (ref 9–12.9)
POTASSIUM SERPL-SCNC: 4.5 MMOL/L (ref 3.6–5.5)
RBC # BLD AUTO: 5.05 M/UL (ref 4.2–5.4)
SODIUM SERPL-SCNC: 140 MMOL/L (ref 135–145)
WBC # BLD AUTO: 7.9 K/UL (ref 4.8–10.8)

## 2025-06-30 PROCEDURE — 85025 COMPLETE CBC W/AUTO DIFF WBC: CPT

## 2025-06-30 PROCEDURE — 80048 BASIC METABOLIC PNL TOTAL CA: CPT

## 2025-06-30 PROCEDURE — 36415 COLL VENOUS BLD VENIPUNCTURE: CPT
